# Patient Record
Sex: MALE | Race: WHITE
[De-identification: names, ages, dates, MRNs, and addresses within clinical notes are randomized per-mention and may not be internally consistent; named-entity substitution may affect disease eponyms.]

---

## 2021-10-15 ENCOUNTER — HOSPITAL ENCOUNTER (EMERGENCY)
Dept: HOSPITAL 60 - LB.ED | Age: 66
Discharge: HOME | End: 2021-10-15
Payer: MEDICARE

## 2021-10-15 DIAGNOSIS — G58.9: Primary | ICD-10-CM

## 2021-10-15 PROCEDURE — A0425 GROUND MILEAGE: HCPCS

## 2021-10-15 PROCEDURE — A0429 BLS-EMERGENCY: HCPCS

## 2021-10-15 NOTE — EDM.PDOC
ED HPI GENERAL MEDICAL PROBLEM





- General


Chief Complaint: Lower Extremity Injury/Pain


Stated Complaint: Rt foot pain


Time Seen by Provider: 10/15/21 22:40





- History of Present Illness


INITIAL COMMENTS - FREE TEXT/NARRATIVE: 





Pt is here by EMS with C/O left toe pain that comes and goes.


This is not new. He just started Lyrica 50 mg TID 2 days ago.


He tells me he has had problems like this for 2-3 years.


He is very talkative. No recent injury.


The pain is gone now.


  ** Right Toe-Middle


Pain Score (Numeric/FACES): 2





Social & Family History





- Recreational Drug Use


Recreational Drug Use: No





Review of Systems





- Review of Systems


Review Of Systems: Comprehensive ROS is negative, except as noted in HPI.


Musculoskeletal: Reports: Other (Pain involving left toes.)





ED EXAM, GENERAL





- Physical Exam


Exam: See Below


General Appearance: Other (very verbose with rapid speech. Poor personal 

hygeine.)


Extremities: Other (examining his left foot reveals the skin is intact. The 

great and second toe is where he had pain. They are not painful now.)





Course





- Vital Signs


Last Recorded V/S: 





                                Last Vital Signs











Temp  97.8 F   10/15/21 22:30


 


Pulse  88   10/15/21 22:30


 


Resp  18   10/15/21 22:30


 


BP  219/129 H  10/15/21 22:30


 


Pulse Ox  97   10/15/21 22:30














- Re-Assessments/Exams


Free Text/Narrative Re-Assessment/Exam: 





10/15/21 23:00


Pt was instructed to stay on his Lyrica, and take 1 extra pill a day when the 

pain flares up.


He also took Losartan 100 mg and 1/2 tab of Metoprolol 100 mg here, saying he 

doesn't always take his medicine.


He will be discharged to home.


He tells me he will be evicted in a day or 2 from the hotel he is staying at.


But then remembers he is paid up for at least another day.


I advised him to check with  on Monday for help with rent.





Departure





- Departure


Time of Disposition: 23:00


Disposition: Home, Self-Care 01


Condition: Good


Clinical Impression: 


Peripheral neuropathy


Qualifiers:


 Peripheral neuropathy type: mononeuropathy, unspecified Qualified Code(s): 

G58.9 - Mononeuropathy, unspecified








- Discharge Information


*PRESCRIPTION DRUG MONITORING PROGRAM REVIEWED*: No


*COPY OF PRESCRIPTION DRUG MONITORING REPORT IN PATIENT [facility]AITLYNN: No


Referrals: 


PCP,None [Primary Care Provider] - 


Forms:  ED Department Discharge


Additional Instructions: 


Pt is to follow up in the clinic next week for re check.





Sepsis Event Note (ED)





- Focused Exam


Vital Signs: 





                                   Vital Signs











  Temp Pulse Resp BP Pulse Ox


 


 10/15/21 22:30  97.8 F  88  18  219/129 H  97

## 2023-06-06 ENCOUNTER — HOSPITAL ENCOUNTER (EMERGENCY)
Dept: HOSPITAL 60 - LB.ED | Age: 68
Discharge: HOME | End: 2023-06-06
Payer: MEDICARE

## 2023-06-06 DIAGNOSIS — Z79.82: ICD-10-CM

## 2023-06-06 DIAGNOSIS — Z88.8: ICD-10-CM

## 2023-06-06 DIAGNOSIS — E78.00: ICD-10-CM

## 2023-06-06 DIAGNOSIS — E11.9: ICD-10-CM

## 2023-06-06 DIAGNOSIS — F41.9: Primary | ICD-10-CM

## 2023-06-06 DIAGNOSIS — F17.210: ICD-10-CM

## 2023-06-06 DIAGNOSIS — E66.9: ICD-10-CM

## 2023-06-06 DIAGNOSIS — I48.91: ICD-10-CM

## 2023-06-06 DIAGNOSIS — I11.0: ICD-10-CM

## 2023-06-06 DIAGNOSIS — I25.2: ICD-10-CM

## 2023-06-06 DIAGNOSIS — I50.9: ICD-10-CM

## 2023-06-06 LAB
ALBUMIN SERPL-MCNC: 4 G/DL (ref 3.4–5)
ALBUMIN/GLOB SERPL: 1.1 {RATIO} (ref 0.8–2)
ALP SERPL-CCNC: 92 U/L (ref 46–116)
ALT SERPL-CCNC: 21 U/L (ref 12–78)
AMPHET UR QL SCN: NEGATIVE
AMPHET UR QL SCN: NEGATIVE
ANION GAP SERPL CALC-SCNC: 11.8 MMOL/L (ref 5–15)
APPEARANCE UR: CLEAR
AST SERPL-CCNC: 13 U/L (ref 15–37)
BARBITURATES UR QL SCN: NEGATIVE
BASOPHILS # BLD AUTO: 0.04 K/UL (ref 0.02–0.1)
BASOPHILS NFR BLD AUTO: 0.4 % (ref 0–0.5)
BENZODIAZ UR QL SCN: NEGATIVE
BILIRUB SERPL-MCNC: 1.6 MG/DL (ref 0–1)
BILIRUB UR STRIP-MCNC: NEGATIVE MG/DL
BUN SERPL-MCNC: 20 MG/DL (ref 8–26)
BUN/CREAT SERPL: 15.5 (ref 6–25)
CALCIUM SERPL-MCNC: 9.7 MG/DL (ref 8.5–10.1)
CHLORIDE SERPL-SCNC: 102 MMOL/L (ref 98–107)
CO2 SERPL-SCNC: 28.9 MMOL/L (ref 21–32)
COLOR UR: YELLOW
CREAT CL 24H UR+SERPL-VRATE: 60.22 ML/MIN
CREAT SERPL-MCNC: 1.29 MG/DL (ref 0.7–1.3)
EOSINOPHIL # BLD AUTO: 0.23 K/UL (ref 0.04–0.4)
EOSINOPHIL NFR BLD AUTO: 2.3 % (ref 1–5)
ERYTHROCYTE [DISTWIDTH] IN BLOOD BY AUTOMATED COUNT: 13.3 % (ref 11–16)
GLOBULIN SER-MCNC: 3.7 G/DL (ref 2.2–4.2)
GLUCOSE SERPL-MCNC: 124 MG/DL (ref 74–100)
GLUCOSE UR STRIP-MCNC: NEGATIVE MG/DL
HCT VFR BLD AUTO: 46 % (ref 40–54)
HGB BLD-MCNC: 16.7 G/DL (ref 13–18)
KETONES UR STRIP-MCNC: NEGATIVE MG/DL
LYMPHOCYTES # BLD AUTO: 1.07 K/UL (ref 1.5–4)
LYMPHOCYTES NFR BLD AUTO: 10.9 % (ref 20–40)
MCH RBC QN AUTO: 31.5 PG (ref 27–32)
MCHC RBC AUTO-ENTMCNC: 36.3 G/DL (ref 31–35)
MCHC RBC AUTO-ENTMCNC: 87 FL (ref 76–96)
METHADONE UR QL SCN: NEGATIVE
MONOCYTES # BLD AUTO: 0.88 K/UL (ref 0.2–0.8)
MONOCYTES NFR BLD AUTO: 9 % (ref 3–10)
NEUTROPHILS # BLD AUTO: 7.61 K/UL (ref 2–7.5)
NEUTROPHILS NFR BLD AUTO: 77.4 % (ref 45–70)
NITRITE UR QL: NEGATIVE
OXYCODONE UR QL SCN: NEGATIVE
PH UR STRIP: 5.5 [PH] (ref 5–8)
PLATELET # BLD AUTO: 174 K/UL (ref 150–400)
PMV BLD AUTO: 10.5 FL (ref 6–10)
POTASSIUM SERPL-SCNC: 3.7 MMOL/L (ref 3.5–5.1)
PROT SERPL-MCNC: 7.7 G/DL (ref 6.4–8.2)
PROT UR STRIP-MCNC: 100 MG/DL
RBC # BLD AUTO: 5.3 M/UL (ref 4.5–6.5)
RBC # URNS HPF: (no result) /HPF
RBC UR QL: NEGATIVE
SODIUM SERPL-SCNC: 139 MMOL/L (ref 136–145)
SP GR UR STRIP: >= 1.03 (ref 1–1.03)
THC UR QL SCN>50 NG/ML: NEGATIVE
UROBILINOGEN UR STRIP-ACNC: 1 E.U./DL (ref 0.2–1)
WBC # BLD AUTO: 9.8 K/UL (ref 4–11)
WBC UR QL: (no result) /HPF

## 2023-06-06 PROCEDURE — 80053 COMPREHEN METABOLIC PANEL: CPT

## 2023-06-06 PROCEDURE — 85025 COMPLETE CBC W/AUTO DIFF WBC: CPT

## 2023-06-06 PROCEDURE — A0425 GROUND MILEAGE: HCPCS

## 2023-06-06 PROCEDURE — A0429 BLS-EMERGENCY: HCPCS

## 2023-06-06 PROCEDURE — 36415 COLL VENOUS BLD VENIPUNCTURE: CPT

## 2023-06-06 PROCEDURE — 81001 URINALYSIS AUTO W/SCOPE: CPT

## 2023-06-06 PROCEDURE — 99283 EMERGENCY DEPT VISIT LOW MDM: CPT

## 2023-06-06 PROCEDURE — 80307 DRUG TEST PRSMV CHEM ANLYZR: CPT

## 2023-06-06 PROCEDURE — 84443 ASSAY THYROID STIM HORMONE: CPT

## 2023-06-15 ENCOUNTER — HOSPITAL ENCOUNTER (EMERGENCY)
Dept: HOSPITAL 60 - LB.ED | Age: 68
Discharge: HOME | End: 2023-06-15
Payer: MEDICARE

## 2023-06-15 DIAGNOSIS — I50.9: ICD-10-CM

## 2023-06-15 DIAGNOSIS — Z79.82: ICD-10-CM

## 2023-06-15 DIAGNOSIS — Z79.899: ICD-10-CM

## 2023-06-15 DIAGNOSIS — Z88.8: ICD-10-CM

## 2023-06-15 DIAGNOSIS — I11.0: ICD-10-CM

## 2023-06-15 DIAGNOSIS — F41.9: Primary | ICD-10-CM

## 2023-06-15 DIAGNOSIS — I48.20: ICD-10-CM

## 2023-06-15 LAB
ALBUMIN SERPL-MCNC: 3.8 G/DL (ref 3.4–5)
ALBUMIN/GLOB SERPL: 1 {RATIO} (ref 0.8–2)
ALP SERPL-CCNC: 93 U/L (ref 46–116)
ALT SERPL-CCNC: 22 U/L (ref 12–78)
ANION GAP SERPL CALC-SCNC: 10.3 MMOL/L (ref 5–15)
AST SERPL-CCNC: 15 U/L (ref 15–37)
BASOPHILS # BLD AUTO: 0.05 K/UL (ref 0.02–0.1)
BASOPHILS NFR BLD AUTO: 0.6 % (ref 0–0.5)
BILIRUB SERPL-MCNC: 1.7 MG/DL (ref 0–1)
BUN SERPL-MCNC: 22 MG/DL (ref 8–26)
BUN/CREAT SERPL: 16.4 (ref 6–25)
CALCIUM SERPL-MCNC: 10.1 MG/DL (ref 8.5–10.1)
CHLORIDE SERPL-SCNC: 100 MMOL/L (ref 98–107)
CO2 SERPL-SCNC: 31 MMOL/L (ref 21–32)
CREAT CL 24H UR+SERPL-VRATE: 57.97 ML/MIN
CREAT SERPL-MCNC: 1.34 MG/DL (ref 0.7–1.3)
EOSINOPHIL # BLD AUTO: 0.2 K/UL (ref 0.04–0.4)
EOSINOPHIL NFR BLD AUTO: 2.3 % (ref 1–5)
ERYTHROCYTE [DISTWIDTH] IN BLOOD BY AUTOMATED COUNT: 13.5 % (ref 11–16)
GLOBULIN SER-MCNC: 3.9 G/DL (ref 2.2–4.2)
GLUCOSE SERPL-MCNC: 179 MG/DL (ref 74–100)
HCT VFR BLD AUTO: 43 % (ref 40–54)
HGB BLD-MCNC: 15.5 G/DL (ref 13–18)
LYMPHOCYTES # BLD AUTO: 0.95 K/UL (ref 1.5–4)
LYMPHOCYTES NFR BLD AUTO: 11.1 % (ref 20–40)
MCH RBC QN AUTO: 31.5 PG (ref 27–32)
MCHC RBC AUTO-ENTMCNC: 36 G/DL (ref 31–35)
MCHC RBC AUTO-ENTMCNC: 87 FL (ref 76–96)
MONOCYTES # BLD AUTO: 0.68 K/UL (ref 0.2–0.8)
MONOCYTES NFR BLD AUTO: 7.9 % (ref 3–10)
NEUTROPHILS # BLD AUTO: 6.68 K/UL (ref 2–7.5)
NEUTROPHILS NFR BLD AUTO: 78.1 % (ref 45–70)
PLATELET # BLD AUTO: 197 K/UL (ref 150–400)
PMV BLD AUTO: 10 FL (ref 6–10)
POTASSIUM SERPL-SCNC: 4.3 MMOL/L (ref 3.5–5.1)
PROT SERPL-MCNC: 7.7 G/DL (ref 6.4–8.2)
RBC # BLD AUTO: 4.92 M/UL (ref 4.5–6.5)
SODIUM SERPL-SCNC: 137 MMOL/L (ref 136–145)
WBC # BLD AUTO: 8.6 K/UL (ref 4–11)

## 2023-06-15 PROCEDURE — 93005 ELECTROCARDIOGRAM TRACING: CPT

## 2023-06-15 PROCEDURE — 84484 ASSAY OF TROPONIN QUANT: CPT

## 2023-06-15 PROCEDURE — 71045 X-RAY EXAM CHEST 1 VIEW: CPT

## 2023-06-15 PROCEDURE — 84443 ASSAY THYROID STIM HORMONE: CPT

## 2023-06-15 PROCEDURE — 80053 COMPREHEN METABOLIC PANEL: CPT

## 2023-06-15 PROCEDURE — 36415 COLL VENOUS BLD VENIPUNCTURE: CPT

## 2023-06-15 PROCEDURE — 85025 COMPLETE CBC W/AUTO DIFF WBC: CPT

## 2023-06-15 PROCEDURE — 99285 EMERGENCY DEPT VISIT HI MDM: CPT

## 2023-09-05 ENCOUNTER — HOSPITAL ENCOUNTER (EMERGENCY)
Dept: HOSPITAL 60 - LB.ED | Age: 68
Discharge: LEFT BEFORE DISCHARGE | End: 2023-09-05
Payer: MEDICARE

## 2023-09-05 DIAGNOSIS — Z79.82: ICD-10-CM

## 2023-09-05 DIAGNOSIS — R47.1: ICD-10-CM

## 2023-09-05 DIAGNOSIS — I25.2: ICD-10-CM

## 2023-09-05 DIAGNOSIS — E78.00: ICD-10-CM

## 2023-09-05 DIAGNOSIS — R07.9: Primary | ICD-10-CM

## 2023-09-05 DIAGNOSIS — I10: ICD-10-CM

## 2023-09-05 DIAGNOSIS — F17.210: ICD-10-CM

## 2023-09-05 DIAGNOSIS — E66.9: ICD-10-CM

## 2023-09-05 DIAGNOSIS — I48.91: ICD-10-CM

## 2023-09-05 DIAGNOSIS — Z53.29: ICD-10-CM

## 2023-09-05 DIAGNOSIS — Z88.8: ICD-10-CM

## 2023-09-05 LAB
ALBUMIN SERPL-MCNC: 3.9 G/DL (ref 3.4–5)
ALBUMIN/GLOB SERPL: 1.1 {RATIO} (ref 0.8–2)
ALP SERPL-CCNC: 77 U/L (ref 46–116)
ALT SERPL-CCNC: 21 U/L (ref 12–78)
ANION GAP SERPL CALC-SCNC: 13.9 MMOL/L (ref 5–15)
AST SERPL-CCNC: 10 U/L (ref 15–37)
BASOPHILS # BLD AUTO: 0.03 K/UL (ref 0.02–0.1)
BASOPHILS NFR BLD AUTO: 0.3 % (ref 0–0.5)
BILIRUB SERPL-MCNC: 1.6 MG/DL (ref 0–1)
BUN SERPL-MCNC: 33 MG/DL (ref 8–26)
BUN/CREAT SERPL: 19.5 (ref 6–25)
CALCIUM SERPL-MCNC: 9.4 MG/DL (ref 8.5–10.1)
CHLORIDE SERPL-SCNC: 102 MMOL/L (ref 98–107)
CO2 SERPL-SCNC: 25.8 MMOL/L (ref 21–32)
CREAT CL 24H UR+SERPL-VRATE: 45.96 ML/MIN
CREAT SERPL-MCNC: 1.69 MG/DL (ref 0.7–1.3)
EOSINOPHIL # BLD AUTO: 0.1 K/UL (ref 0.04–0.4)
EOSINOPHIL NFR BLD AUTO: 1 % (ref 1–5)
ERYTHROCYTE [DISTWIDTH] IN BLOOD BY AUTOMATED COUNT: 13.6 % (ref 11–16)
GLOBULIN SER-MCNC: 3.7 G/DL (ref 2.2–4.2)
GLUCOSE SERPL-MCNC: 128 MG/DL (ref 74–100)
HCT VFR BLD AUTO: 38.4 % (ref 40–54)
HGB BLD-MCNC: 13.9 G/DL (ref 13–18)
LYMPHOCYTES # BLD AUTO: 1.08 K/UL (ref 1.5–4)
LYMPHOCYTES NFR BLD AUTO: 11.1 % (ref 20–40)
MCH RBC QN AUTO: 31.4 PG (ref 27–32)
MCHC RBC AUTO-ENTMCNC: 36.2 G/DL (ref 31–35)
MCHC RBC AUTO-ENTMCNC: 87 FL (ref 76–96)
MONOCYTES # BLD AUTO: 0.79 K/UL (ref 0.2–0.8)
MONOCYTES NFR BLD AUTO: 8.1 % (ref 3–10)
NEUTROPHILS # BLD AUTO: 7.7 K/UL (ref 2–7.5)
NEUTROPHILS NFR BLD AUTO: 79.5 % (ref 45–70)
PLATELET # BLD AUTO: 167 K/UL (ref 150–400)
PMV BLD AUTO: 9.9 FL (ref 6–10)
POTASSIUM SERPL-SCNC: 3.7 MMOL/L (ref 3.5–5.1)
PROT SERPL-MCNC: 7.6 G/DL (ref 6.4–8.2)
RBC # BLD AUTO: 4.42 M/UL (ref 4.5–6.5)
SODIUM SERPL-SCNC: 138 MMOL/L (ref 136–145)
WBC # BLD AUTO: 9.7 K/UL (ref 4–11)

## 2023-09-05 PROCEDURE — A0429 BLS-EMERGENCY: HCPCS

## 2023-09-05 PROCEDURE — A0425 GROUND MILEAGE: HCPCS
